# Patient Record
Sex: FEMALE | Race: BLACK OR AFRICAN AMERICAN | Employment: FULL TIME | URBAN - METROPOLITAN AREA
[De-identification: names, ages, dates, MRNs, and addresses within clinical notes are randomized per-mention and may not be internally consistent; named-entity substitution may affect disease eponyms.]

---

## 2019-10-23 ENCOUNTER — HOSPITAL ENCOUNTER (EMERGENCY)
Age: 29
Discharge: HOME | End: 2019-10-23
Attending: STUDENT IN AN ORGANIZED HEALTH CARE EDUCATION/TRAINING PROGRAM
Payer: COMMERCIAL

## 2019-10-23 VITALS
HEART RATE: 86 BPM | SYSTOLIC BLOOD PRESSURE: 142 MMHG | OXYGEN SATURATION: 100 % | DIASTOLIC BLOOD PRESSURE: 79 MMHG | TEMPERATURE: 98.6 F | RESPIRATION RATE: 16 BRPM

## 2019-10-23 DIAGNOSIS — G51.0 LEFT-SIDED BELL'S PALSY: Primary | ICD-10-CM

## 2019-10-23 PROCEDURE — 99283 EMERGENCY DEPT VISIT LOW MDM: CPT

## 2019-10-23 RX ORDER — VALACYCLOVIR HYDROCHLORIDE 1 G/1
1000 TABLET, FILM COATED ORAL 3 TIMES DAILY
Qty: 21 TAB | Refills: 0 | Status: SHIPPED | OUTPATIENT
Start: 2019-10-23 | End: 2019-10-23

## 2019-10-23 RX ORDER — VALACYCLOVIR HYDROCHLORIDE 1 G/1
1000 TABLET, FILM COATED ORAL 3 TIMES DAILY
Qty: 21 TAB | Refills: 0 | Status: SHIPPED | OUTPATIENT
Start: 2019-10-23 | End: 2019-10-30

## 2019-10-23 RX ORDER — PREDNISONE 20 MG/1
60 TABLET ORAL DAILY
Qty: 21 TAB | Refills: 0 | Status: SHIPPED | OUTPATIENT
Start: 2019-10-23 | End: 2019-10-23

## 2019-10-23 RX ORDER — PREDNISONE 20 MG/1
60 TABLET ORAL DAILY
Qty: 21 TAB | Refills: 0 | Status: SHIPPED | OUTPATIENT
Start: 2019-10-23 | End: 2019-10-30

## 2019-10-23 NOTE — ED PROVIDER NOTES
Initial Complaint: left face wekness    Started: today when brushing teeth ~ 0700    Endorses: Difficulty drinking coffee this morning  Denies: F/C, N/V, dizziness, recent illness, gait difficulty, h/o Bell's palsy, fells well otherwise. On OCP    Made better: nothing  Made worse: nothing    No further complaints. No past medical history on file. No past surgical history on file. Reviewed      Primary care provider: Brandan Tejeda MD           No past medical history on file. No past surgical history on file.       Family History:   Problem Relation Age of Onset    Diabetes Mother     Sickle Cell Trait Sister     Diabetes Maternal Grandmother     Cancer Paternal Grandmother         stomach     Social History     Socioeconomic History    Marital status: SINGLE     Spouse name: Not on file    Number of children: Not on file    Years of education: Not on file    Highest education level: Not on file   Occupational History    Not on file   Social Needs    Financial resource strain: Not on file    Food insecurity:     Worry: Not on file     Inability: Not on file    Transportation needs:     Medical: Not on file     Non-medical: Not on file   Tobacco Use    Smoking status: Never Smoker    Smokeless tobacco: Never Used   Substance and Sexual Activity    Alcohol use: Yes     Comment: socially    Drug use: No    Sexual activity: Yes     Partners: Male     Birth control/protection: Condom   Lifestyle    Physical activity:     Days per week: Not on file     Minutes per session: Not on file    Stress: Not on file   Relationships    Social connections:     Talks on phone: Not on file     Gets together: Not on file     Attends Mandaen service: Not on file     Active member of club or organization: Not on file     Attends meetings of clubs or organizations: Not on file     Relationship status: Not on file    Intimate partner violence:     Fear of current or ex partner: Not on file Emotionally abused: Not on file     Physically abused: Not on file     Forced sexual activity: Not on file   Other Topics Concern    Not on file   Social History Narrative    Not on file     ALLERGIES: Patient has no known allergies. Review of Systems   Constitutional: Negative for chills and fever. Gastrointestinal: Negative for nausea and vomiting. Neurological: Positive for numbness. Left facial droop   Psychiatric/Behavioral: Negative. All other systems reviewed and are negative. Vitals:    10/23/19 0955   BP: 142/79   Pulse: 86   Resp: 16   Temp: 98.6 °F (37 °C)   SpO2: 100%            Physical Exam   Constitutional: She is oriented to person, place, and time. She appears well-developed and well-nourished. HENT:   Head: Normocephalic and atraumatic. Nose: Nose normal.   Mouth/Throat: Uvula is midline and oropharynx is clear and moist.   Eyes: Conjunctivae, EOM and lids are normal.   Difficulty keeping left eye shut when closed. Lid lag with blinking on left. Neck: Normal range of motion. Neck supple. Cardiovascular: Normal rate, regular rhythm, normal heart sounds and intact distal pulses. Pulmonary/Chest: Effort normal and breath sounds normal. No respiratory distress. She has no wheezes. She has no rales. She exhibits no tenderness. Abdominal: Soft. Bowel sounds are normal. There is no tenderness. There is no guarding. Musculoskeletal: Normal range of motion. Neurological: She is alert and oriented to person, place, and time. A cranial nerve deficit is present. Difficulty leg raising left eyebrow, making full pucker with lips, left-sided facial droop with full smile. Skin: Skin is warm and dry. No erythema. Psychiatric: She has a normal mood and affect. Her behavior is normal. Judgment and thought content normal.   Nursing note and vitals reviewed.      [facility]       Procedures      Assessment & Plan:     No orders of the defined types were placed in this encounter. Seen by & Discussed with Regla Simmons DO,ED Provider    Evaristo Jeffery NP  10/23/19  10:06 AM    10:19 AM  Patient re-evaluated. All questions answered. Patient appropriate for discharge. Given return precautions and follow up instructions. LABORATORY TESTS:  Labs Reviewed - No data to display    IMAGING RESULTS:  No orders to display       MEDICATIONS GIVEN:  Medications - No data to display    IMPRESSION:  1. Left-sided Bell's palsy        PLAN:  1. Current Discharge Medication List      START taking these medications    Details   predniSONE (DELTASONE) 20 mg tablet Take 60 mg by mouth daily for 7 days. With Breakfast  Qty: 21 Tab, Refills: 0      valACYclovir (VALTREX) 1 gram tablet Take 1 Tab by mouth three (3) times daily for 7 days. Indications: [place] palsy  Qty: 21 Tab, Refills: 0      white petrolatum-mineral oil (LACRILUBE S.O.P.) 56.8-42.5 % ointment Administer 7 g to both eyes four (4) times daily. Qty: 7 g, Refills: 0           2. Follow-up Information     Follow up With Specialties Details Why Contact Info    Tere Blackman MD [facility] Schedule an appointment as soon as possible for a visit  [address] [place] [address]  [phone]      [facility] EMERGENCY DEPT Emergency Medicine  As needed, If symptoms worsen [address] [place] [address]  [phone]        3. Return to ED for new or worsening symptoms       Evaristo Jeffery NP                  Please note that this dictation was completed with [employer], the computer voice recognition software. Quite often unanticipated grammatical, syntax, homophones, and other interpretive errors are inadvertently transcribed by the computer software. Please disregard these errors. Please excuse any errors that have escaped final proofreading.

## 2019-10-23 NOTE — DISCHARGE INSTRUCTIONS
Thank you for allowing us to care for you today. Please follow-up with your Primary Care provider in the next 2-3 days if your symptoms do not improve. Plan for home:     Eye drops frequently to the left eye to prevent dry eye. If dry is left untreated you will have worsening vision and develop a painful that could require a corneal transplant. Use lacriclube frequently through the day to prevent dry eye. Use an eye patch or tape eye lid closed at night. Valacyclovir 3 times daily for 7 days    Prednisone 60 mg tablets, take before lunch each day for the next 5 days. Taking too late in the day can cause a disruption in your sleep cycle. Follow-up with neurology if symptoms do not improve. Come back to the ER if you have worsening symptoms, fevers over 100.9, shaking chills, nausea or vomiting. Patient Education        Bell's Palsy: Care Instructions  Your Care Instructions    Bell's palsy is paralysis or weakness of the muscles on one side of the face. Often people with Bell's palsy have a droop on one side of the mouth and have trouble completely shutting the eye on the same side. Bell's palsy can also interfere with the sense of taste. These things happen when a nerve in your face becomes inflamed. Bell's palsy is not caused by a stroke. The cause of the nerve inflammation is not known. But some experts think that a virus may cause it. Because of this, doctors sometimes prescribe antiviral medicine to treat it. You also may get medicine to reduce swelling. Bell's palsy usually gets better on its own in a few weeks or months. Follow-up care is a key part of your treatment and safety. Be sure to make and go to all appointments, and call your doctor if you are having problems. It's also a good idea to know your test results and keep a list of the medicines you take. How can you care for yourself at home? · Take your medicines exactly as prescribed.  Call your doctor if you think you are having a problem with your medicine. You will get more details on the specific medicines your doctor prescribes. · Use artificial tears or ointment if your eyes are too dry. Bell's palsy can make your lower eyelid droop, causing a dry eye. · If you cannot completely close your eye, consider using an eye patch while you sleep. · Help yourself blink by using your finger to close and open your eyelid. This may help keep your eye moist.  · Wear glasses or goggles to keep dust and dirt out of your eye. · As feeling comes back to your face, massage your forehead, cheeks, and lips. Massage may make the muscles in your face stronger. · Brush and floss your teeth often to help prevent tooth decay. Bell's palsy can dry up the spit on one side of your mouth. This increases the risk of tooth decay. When should you call for help? Call 911 anytime you think you may need emergency care. For example, call if:    · You have symptoms of a stroke. These may include:  ? Sudden numbness, tingling, weakness, or loss of movement in your face, arm, or leg, especially on only one side of your body. ? Sudden vision changes. ? Sudden trouble speaking. ? Sudden confusion or trouble understanding simple statements. ? Sudden problems with walking or balance. ? A sudden, severe headache that is different from past headaches.    Call your doctor now or seek immediate medical care if:    · You have numbness or weakness that spreads beyond one side of your face.     · You have a skin rash or eye pain or redness, or light bothers your eyes.     · You have a new or worse headache.    Watch closely for changes in your health, and be sure to contact your doctor if:    · You do not get better as expected. Where can you learn more? Go to http://ely-balbina.info/. Enter P168 in the search box to learn more about \"Bell's Palsy: Care Instructions. \"  Current as of: March 28, 2019  Content Version: 12.2  © 2877-1646 [employer]. Care instructions adapted under license by [employer] (which disclaims liability or warranty for this information). If you have questions about a medical condition or this instruction, always ask your healthcare professional. [address] any warranty or liability for your use of this information.

## 2019-10-23 NOTE — ED TRIAGE NOTES
Pt reports left sided facial droop that started around 0700 this morning. Denies dizziness, unilateral arm/leg weakness, speech difficulties, vision changes.

## 2019-10-25 NOTE — ED NOTES
10/25/19  1153    This RN accessed patient chart as patient was calling to obtain work note, spoke with Dr. Cha Rodriguez that agreed to work note for the day of ED visit 10/23    Voicemail left for patient to return call

## 2019-10-28 ENCOUNTER — OFFICE VISIT (OUTPATIENT)
Dept: FAMILY MEDICINE CLINIC | Age: 29
End: 2019-10-28

## 2019-10-28 VITALS
WEIGHT: 211.8 LBS | TEMPERATURE: 99.3 F | OXYGEN SATURATION: 98 % | HEART RATE: 95 BPM | BODY MASS INDEX: 32.1 KG/M2 | HEIGHT: 68 IN | SYSTOLIC BLOOD PRESSURE: 139 MMHG | DIASTOLIC BLOOD PRESSURE: 84 MMHG | RESPIRATION RATE: 16 BRPM

## 2019-10-28 DIAGNOSIS — G51.0 BELL'S PALSY: Primary | ICD-10-CM

## 2019-10-28 NOTE — PROGRESS NOTES
[address] [facility] [address]   Office [phone], Fax [phone]      Subjective:     Chief Complaint   Patient presents with    ED Follow-up        HPI:  Perfecto Galloway is a 30 y.o. BLACK OR  female  presenting for ER follow up. Patient was seen in the ER one week ago for left sided facial weakness that started while brushing teeth. She was diagnosed with Bell's palsy and was treated with steroids, Valtrex and eye patch to protect eye from dryness. Today, patient reports she 'can't really tell' if the left side is better. Denies facial pain pain. Has one more dose of Prednisone and Valtrex left to complete full course of treatment. 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 Review of Systems   Constitutional: Negative for chills and fever. Respiratory: Negative for shortness of breath. Cardiovascular: Negative for chest pain. History reviewed. No pertinent past medical history. Current Outpatient Medications on File Prior to Visit   Medication Sig Dispense Refill    predniSONE (DELTASONE) 20 mg tablet Take 60 mg by mouth daily for 7 days. With Breakfast 21 Tab 0    valACYclovir (VALTREX) 1 gram tablet Take 1 Tab by mouth three (3) times daily for 7 days. Indications: [place] palsy 21 Tab 0    white petrolatum-mineral oil (LACRILUBE S.O.P.) 56.8-42.5 % ointment Administer 3.5 g to left eye four (4) times daily. Indications: [place] palsy 7 g 0    norgestimate-ethinyl estradiol (SPRINTEC, 28,) 0.25-35 mg-mcg tab Take 1 Tab by mouth daily. 30 Tab 12     No current facility-administered medications on file prior to visit. No Known Allergies    History reviewed. No pertinent surgical history.     Social History     Socioeconomic History    Marital status: SINGLE     Spouse name: Not on file    Number of children: Not on file    Years of education: Not on file    Highest education level: Not on file   Occupational History    Not on file   Social Needs    Financial resource strain: Not on file    Food insecurity:     Worry: Not on file     Inability: Not on file    Transportation needs:     Medical: Not on file     Non-medical: Not on file   Tobacco Use    Smoking status: Never Smoker    Smokeless tobacco: Never Used   Substance and Sexual Activity    Alcohol use: Yes     Comment: socially    Drug use: No    Sexual activity: Yes     Partners: Male     Birth control/protection: Condom   Lifestyle    Physical activity:     Days per week: Not on file     Minutes per session: Not on file    Stress: Not on file   Relationships    Social connections:     Talks on phone: Not on file     Gets together: Not on file     Attends Jew service: Not on file     Active member of club or organization: Not on file     Attends meetings of clubs or organizations: Not on file     Relationship status: Not on file    Intimate partner violence:     Fear of current or ex partner: Not on file     Emotionally abused: Not on file     Physically abused: Not on file     Forced sexual activity: Not on file   Other Topics Concern    Not on file   Social History Narrative    Not on file       Patient Active Problem List   Diagnosis Code    Family planning, BCP (birth control pills) maintenance Z30.41         Objective:   Vitals - reviewed  Visit Vitals  /84   Pulse 95   Temp 99.3 °F (37.4 °C) (Oral)   Resp 16   Ht 5' 8\" (1.727 m)   Wt 211 lb 12.8 oz (96.1 kg)   SpO2 98%   BMI 32.20 kg/m²        Physical Exam   Constitutional: She appears well-developed and well-nourished. HENT:   Head: Normocephalic and atraumatic. Eyes: Pupils are equal, round, and reactive to light. Left eye lid lag   Neck: Neck supple. Cardiovascular: Normal rate and regular rhythm.    Pulmonary/Chest: Effort normal and breath sounds normal.   Neurological:   Left facial droop             Assessment and orders:       ICD-10-CM ICD-9-CM    1. Bell's palsy G51.0 351.0      Diagnoses and all orders for this visit:    1. Bell's palsy: Remains to have left sided facial droop with left eye lid lag. Symptoms better per report. Counseled on the slow course recovery of the disease. Will consider referral to neurology if symptoms not better. Work note provided. Pt was discussed with Dr. Nazario Parra (attending physician). I have reviewed patient medical and social history and medications. I have reviewed pertinent labs results and other data. I have discussed the diagnosis with the patient and the intended plan as seen in the above orders. The patient has received an after-visit summary and questions were answered concerning future plans. I have discussed medication side effects and warnings with the patient as well.     Fatoumata Fulton MD- [address] [place] [address]  10/28/19

## 2019-10-28 NOTE — PATIENT INSTRUCTIONS
Bell's Palsy: Care Instructions  Your Care Instructions    Bell's palsy is paralysis or weakness of the muscles on one side of the face. Often people with Bell's palsy have a droop on one side of the mouth and have trouble completely shutting the eye on the same side. Bell's palsy can also interfere with the sense of taste. These things happen when a nerve in your face becomes inflamed. Bell's palsy is not caused by a stroke. The cause of the nerve inflammation is not known. But some experts think that a virus may cause it. Because of this, doctors sometimes prescribe antiviral medicine to treat it. You also may get medicine to reduce swelling. Bell's palsy usually gets better on its own in a few weeks or months. Follow-up care is a key part of your treatment and safety. Be sure to make and go to all appointments, and call your doctor if you are having problems. It's also a good idea to know your test results and keep a list of the medicines you take. How can you care for yourself at home? · Take your medicines exactly as prescribed. Call your doctor if you think you are having a problem with your medicine. You will get more details on the specific medicines your doctor prescribes. · Use artificial tears or ointment if your eyes are too dry. Bell's palsy can make your lower eyelid droop, causing a dry eye. · If you cannot completely close your eye, consider using an eye patch while you sleep. · Help yourself blink by using your finger to close and open your eyelid. This may help keep your eye moist.  · Wear glasses or goggles to keep dust and dirt out of your eye. · As feeling comes back to your face, massage your forehead, cheeks, and lips. Massage may make the muscles in your face stronger. · Brush and floss your teeth often to help prevent tooth decay. Bell's palsy can dry up the spit on one side of your mouth. This increases the risk of tooth decay. When should you call for help?   Call 911 anytime you think you may need emergency care. For example, call if:    · You have symptoms of a stroke. These may include:  ? Sudden numbness, tingling, weakness, or loss of movement in your face, arm, or leg, especially on only one side of your body. ? Sudden vision changes. ? Sudden trouble speaking. ? Sudden confusion or trouble understanding simple statements. ? Sudden problems with walking or balance. ? A sudden, severe headache that is different from past headaches.    Call your doctor now or seek immediate medical care if:    · You have numbness or weakness that spreads beyond one side of your face.     · You have a skin rash or eye pain or redness, or light bothers your eyes.     · You have a new or worse headache.    Watch closely for changes in your health, and be sure to contact your doctor if:    · You do not get better as expected. Where can you learn more? Go to http://ely-balibna.info/. Enter P168 in the search box to learn more about \"Bell's Palsy: Care Instructions. \"  Current as of: March 28, 2019  Content Version: 12.2  © 4957-8267 [employer]. Care instructions adapted under license by [employer] (which disclaims liability or warranty for this information). If you have questions about a medical condition or this instruction, always ask your healthcare professional. [address] any warranty or liability for your use of this information.

## 2019-10-28 NOTE — PROGRESS NOTES
Chief Complaint   Patient presents with   [facility] ED Follow-up     Blood pressure 139/84, pulse 95, temperature 99.3 °F (37.4 °C), temperature source Oral, resp. rate 16, height 5' 8\" (1.727 m), weight 211 lb 12.8 oz (96.1 kg), SpO2 98 %. 1. Have you been to the ER, urgent care clinic since your last visit? Hospitalized since your last visit? Yes When: 10/23/19 Where: [facility]     2. Have you seen or consulted any other health care providers outside of the [address] [place] [address] since your last visit? Include any pap smears or colon screening.  No

## 2019-10-28 NOTE — LETTER
NOTIFICATION RETURN TO WORK / SCHOOL 
 
10/28/2019 11:40 AM 
 
Ms. Danielle Camacho [address] [id number] To Whom It May Concern: 
 
Ba Ryder is currently under the care of [facility]. She will return back to work 11/4/2019 If there are questions or concerns please have the patient contact our office.  
 
 
 
Sincerely, 
 
 
Mi Molina MD

## 2019-11-04 ENCOUNTER — TELEPHONE (OUTPATIENT)
Dept: FAMILY MEDICINE CLINIC | Age: 29
End: 2019-11-04

## 2019-11-04 DIAGNOSIS — G51.0 BELL'S PALSY: Primary | ICD-10-CM

## 2019-11-04 NOTE — TELEPHONE ENCOUNTER
----- Message from Xiang Jaramillo sent at 11/4/2019  3:38 PM EST -----  Regarding: Dr. Darell Tee returned a phone call from [facility].  Best contact number is [phone]

## 2019-11-04 NOTE — TELEPHONE ENCOUNTER
----- Message from Sky Sanchez sent at 11/4/2019  1:32 PM EST -----  Regarding: Dr. Nico Pyle Message/Vendor Calls    Caller's first and last name:  Pt    Reason for call:  Pt requesting a referral for a neurogolist    Callback required yes/no and why:  Yes    Best contact number(s):  [phone]    Details to clarify the request:  Pt was advised to contact Dr. Mellody Dandy for a neurology referral if she was still in pain after last appt on 10/28     Sky Sanchez

## 2019-11-04 NOTE — PROGRESS NOTES
Attempted to call patient to notify referral for neurology has been placed. Left voicemail for patient to return call.

## 2019-11-04 NOTE — TELEPHONE ENCOUNTER
Two patient identifiers confirmed. Notified patient referral for neurology has been placed. Patient given information to call and schedule to referred neurologist Dr. Lynda Kaplan. Patient advised once appointment has been scheduled to call office back to speak with referral coordinator [facility]. Patient verbalized understanding.

## 2019-12-20 ENCOUNTER — OFFICE VISIT (OUTPATIENT)
Dept: NEUROLOGY | Age: 29
End: 2019-12-20

## 2019-12-20 VITALS
OXYGEN SATURATION: 98 % | RESPIRATION RATE: 16 BRPM | DIASTOLIC BLOOD PRESSURE: 88 MMHG | TEMPERATURE: 99.2 F | SYSTOLIC BLOOD PRESSURE: 110 MMHG | BODY MASS INDEX: 32.11 KG/M2 | HEIGHT: 68 IN | HEART RATE: 83 BPM | WEIGHT: 211.86 LBS

## 2019-12-20 DIAGNOSIS — G51.0 BELL'S PALSY: ICD-10-CM

## 2019-12-20 DIAGNOSIS — R29.810 WEAKNESS ON LEFT SIDE OF FACE: Primary | ICD-10-CM

## 2019-12-20 RX ORDER — MEDROXYPROGESTERONE ACETATE 150 MG/ML
INJECTION, SUSPENSION INTRAMUSCULAR
COMMUNITY

## 2019-12-20 NOTE — PATIENT INSTRUCTIONS
RESULT POLICY If we have ordered testing for you, know that; \"NO NEWS IS GOOD NEWS! \" It is our policy that we know longer call patients with results, nor do we  give test results over the phone. We schedule follow up appointments so that your results can be discussed in person. This allows you to address any questions you have regarding the results. If you choose to go to an imaging center outside of [facility], it is your responsibility to bring imaging report and disc to follow up appointment. If something of concern is revealed on your test, we will contact you to discuss the matter and if needed schedule a sooner follow up appointment. Additionally, results may be found by using the My Chart feature and one of our patient service representatives at the  can give you instructions on how to access this feature to utilize our electronic medical record system. Thank you for your understanding. PRESCRIPTION REFILL POLICY [facility] Statement to Patients April 1, 2014 In an effort to ensure the large volume of patient prescription refills is processed in the most efficient and expeditious manner, we are asking our patients to assist us by calling your Pharmacy for all prescription refills, this will include also your  Mail Order Pharmacy. The pharmacy will contact our office electronically to continue the refill process. Please do not wait until the last minute to call your pharmacy. We need at least 48 hours (2days) to fill prescriptions. We also encourage you to call your pharmacy before going to  your prescription to make sure it is ready. With regard to controlled substance prescription refill requests (narcotic refills) that need to be picked up at our office, we ask your cooperation by providing us with at least 72 hours (3days) notice that you will need a refill. We will not refill narcotic prescription refill requests after 4:00pm on any weekday, Monday through Thursday, or after 2:00pm on Fridays, or on the weekends. We encourage everyone to explore another way of getting your prescription refill request processed using [employer], our patient web portal through our electronic medical record system. [employer] is an efficient and effective way to communicate your medication request directly to the office and  downloadable as an nila on your smart phone . [employer] also features a review functionality that allows you to view your medication list as well as leave messages for your physician. Are you ready to get connected? If so please review the attatched instructions or speak to any of our staff to get you set up right away! Thank you so much for your cooperation. Should you have any questions please contact our Practice Administrator.

## 2019-12-20 NOTE — PROGRESS NOTES
[address] [facility] and [address] at [facility] at [facility] [address], [address] [facility] [address] [address]  [phone] Office  [phone] Facsimile           Referring: Isa Scales MD      Chief Complaint   Patient presents with    New Patient    Facial Droop     started in October left side.  had one round of valacyclovir. has had some improvement     25-year-old right-handed woman who comes today for evaluation what she calls Bell's palsy. In October she says around the 23rd or so she got up one morning and was brushing her teeth and she says the left side of her face felt funny. She did not think too much about it and she went on to work. When speaking with a coworker her coworker noted that her face was drooping on the left. She then went to the emergency department at [facility] where she was diagnosed with Bell's palsy. She notes that the left face had drooping. She was unable to close the eye. She did have some mild pain. She not been ill. No rash. No fever or chills. No history of connective tissue disorder. No history of having had this before. She was put on an antiviral as well as some steroid and sent home. Over the last couple of months things have gotten better although she still has not improved close to normal.  She is not able to close the eye all the way. She still has significant drooping of the face. No weakness of the extremities. No loss or alteration in conscious. No palpitations shortness of breath. History reviewed. No pertinent past medical history. History reviewed. No pertinent surgical history. Current Outpatient Medications   Medication Sig Dispense Refill    medroxyPROGESTERone (DEPO-PROVERA) 150 mg/mL injection Depo-Provera 150 mg/mL intramuscular suspension   150 mg IM q 12 weeks.   Please dispense with Syringe and needle.  white petrolatum-mineral oil (LACRILUBE S.O.P.) 56.8-42.5 % ointment Administer 3.5 g to left eye four (4) times daily. Indications: [place] palsy 7 g 0        No Known Allergies    Social History     Tobacco Use    Smoking status: Never Smoker    Smokeless tobacco: Never Used   Substance Use Topics    Alcohol use: Yes     Comment: socially    Drug use: No       Family History   Problem Relation Age of Onset    Diabetes Mother     Sickle Cell Trait Sister     Diabetes Maternal Grandmother     Cancer Paternal Grandmother         stomach       Review of Systems  Pertinent positives and negatives as noted with remainder of comprehensive review negative      Examination  Visit Vitals  /88 (BP 1 Location: Left arm, BP Patient Position: Sitting)   Pulse 83   Temp 99.2 °F (37.3 °C) (Oral)   Resp 16   Ht 5' 8\" (1.727 m)   Wt 96.1 kg (211 lb 13.8 oz)   SpO2 98%   BMI 32.21 kg/m²     Pleasant, well appearing. Dress and grooming are appropriate. No scleral icterus is present. Oropharynx is clear. Supple neck without bruit appreciated. Heart regular. Pulses are symmetrical.  No edema in the lower extremities. Ear examination normal.  Skin without rash or other abnormality. Pleasant young woman with appropriate dress grooming and affect. Her cognition is normal.  Speech and language normal.  Cranial nerves intact 2-12 with the exception of a left peripheral seventh palsy. She is unable to close the left eye completely and there is still the lower portion of the sclera being shown and Bell's phenomenon is present. She has no pronation and no drift. No abnormal movement. She has full strength in all extremities in all muscle groups to direct testing. Reflexes globally depressed but symmetrical.  Toes are down. No Feliberto. No ataxia. Sensory to primary. Steady gait.       Impression/Plan  81-year-old with a 2-month history of left-sided Bell's palsy that has improved by her history but still has significant deficit. Given this we will proceed with MRI of the brain with attention to the left cranial nerve VII to exclude any anatomic abnormality that could be responsible. Check carotid Doppler as well. We will get her back in and see how she is doing in the next few weeks. If she has reached a stabilization point then consideration for plastic surgery evaluation. Eliot Magaña MD    This note was created using voice recognition software. Despite editing, there may be syntax errors. This note will not be viewable in [address]

## 2019-12-27 ENCOUNTER — HOSPITAL ENCOUNTER (OUTPATIENT)
Dept: MRI IMAGING | Age: 29
Discharge: HOME | End: 2019-12-27
Attending: PSYCHIATRY & NEUROLOGY

## 2019-12-27 DIAGNOSIS — G51.0 BELL'S PALSY: ICD-10-CM

## 2019-12-27 DIAGNOSIS — R29.810 WEAKNESS ON LEFT SIDE OF FACE: ICD-10-CM

## 2020-01-06 ENCOUNTER — HOSPITAL ENCOUNTER (OUTPATIENT)
Dept: MRI IMAGING | Age: 30
Discharge: HOME | End: 2020-01-06
Attending: PSYCHIATRY & NEUROLOGY
Payer: COMMERCIAL

## 2020-01-06 VITALS — WEIGHT: 212 LBS | BODY MASS INDEX: 32.23 KG/M2

## 2020-01-06 PROCEDURE — 74011250636 HC RX REV CODE- 250/636: Performed by: RADIOLOGY

## 2020-01-06 PROCEDURE — A9575 INJ GADOTERATE MEGLUMI 0.1ML: HCPCS | Performed by: RADIOLOGY

## 2020-01-06 PROCEDURE — 70553 MRI BRAIN STEM W/O & W/DYE: CPT

## 2020-01-06 RX ORDER — GADOTERATE MEGLUMINE 376.9 MG/ML
19 INJECTION INTRAVENOUS
Status: COMPLETED | OUTPATIENT
Start: 2020-01-06 | End: 2020-01-06

## 2020-01-06 RX ADMIN — GADOTERATE MEGLUMINE 19 ML: 376.9 INJECTION INTRAVENOUS at 19:25

## 2020-01-15 ENCOUNTER — OFFICE VISIT (OUTPATIENT)
Dept: NEUROLOGY | Age: 30
End: 2020-01-15

## 2020-01-15 VITALS
DIASTOLIC BLOOD PRESSURE: 84 MMHG | WEIGHT: 212 LBS | OXYGEN SATURATION: 98 % | RESPIRATION RATE: 16 BRPM | HEART RATE: 72 BPM | HEIGHT: 68 IN | BODY MASS INDEX: 32.13 KG/M2 | SYSTOLIC BLOOD PRESSURE: 126 MMHG

## 2020-01-15 DIAGNOSIS — G51.0 BELL'S PALSY: Primary | ICD-10-CM

## 2020-01-15 NOTE — PROGRESS NOTES
[facility] and [address] at [facility] at [address] [facility], [address] [facility] [address] [facility], [address] [place] [address]   [phone]              Chief Complaint   Patient presents with    Facial Droop     result mri, dop     Current Outpatient Medications   Medication Sig Dispense Refill    medroxyPROGESTERone (DEPO-PROVERA) 150 mg/mL injection Depo-Provera 150 mg/mL intramuscular suspension   150 mg IM q 12 weeks. Please dispense with Syringe and needle.  white petrolatum-mineral oil (LACRILUBE S.O.P.) 56.8-42.5 % ointment Administer 3.5 g to left eye four (4) times daily. Indications: [place] palsy 7 g 0      No Known Allergies  Social History     Tobacco Use    Smoking status: Never Smoker    Smokeless tobacco: Never Used   Substance Use Topics    Alcohol use: Yes     Comment: socially    Drug use: No     Returns today for follow-up. She is a lady I saw about 3 weeks or so we will go with Bell's palsy. Given this has been ongoing for about 2 months we sent her for MRI of the brain as well as a carotid Doppler. Her MRI demonstrated no significant abnormality. Carotid Doppler was unremarkable. She continues to be about the same. No significant improvement but no worsening. Examination  Visit Vitals  Ht 5' 8\" (1.727 m)   Wt 96.2 kg (212 lb)   BMI 32.23 kg/m²   Very pleasant young woman. Awake alert oriented and conversant. Normal speech and language. Left peripheral seventh still present and obvious. Impression/Plan  Bell's palsy about 3 months out still with significant deficit and normal eval.  We discussed that it could get better but given its been about 3 months I am going to go ahead and refer her over to see plastic surgery to discuss options. Follow-up with me in about 2 months    Dayton Phipps MD      This note was created using voice recognition software.  Despite editing, there may be syntax errors. This note will not be viewable in [address]

## 2023-05-21 RX ORDER — MEDROXYPROGESTERONE ACETATE 150 MG/ML
INJECTION, SUSPENSION INTRAMUSCULAR
COMMUNITY